# Patient Record
Sex: FEMALE | Race: WHITE
[De-identification: names, ages, dates, MRNs, and addresses within clinical notes are randomized per-mention and may not be internally consistent; named-entity substitution may affect disease eponyms.]

---

## 2018-03-25 ENCOUNTER — HOSPITAL ENCOUNTER (OUTPATIENT)
Dept: HOSPITAL 31 - C.ER | Age: 43
Setting detail: OBSERVATION
LOS: 1 days | Discharge: HOME | End: 2018-03-26
Attending: FAMILY MEDICINE | Admitting: FAMILY MEDICINE
Payer: COMMERCIAL

## 2018-03-25 DIAGNOSIS — K35.3: Primary | ICD-10-CM

## 2018-03-25 DIAGNOSIS — K21.9: ICD-10-CM

## 2018-03-25 LAB
ALBUMIN SERPL-MCNC: 4.2 G/DL (ref 3.5–5)
ALBUMIN/GLOB SERPL: 1.1 {RATIO} (ref 1–2.1)
ALT SERPL-CCNC: 41 U/L (ref 9–52)
APTT BLD: 27 SECONDS (ref 21–34)
AST SERPL-CCNC: 44 U/L (ref 14–36)
BASOPHILS # BLD AUTO: 0.1 K/UL (ref 0–0.2)
BASOPHILS NFR BLD: 0.5 % (ref 0–2)
BILIRUB UR-MCNC: NEGATIVE MG/DL
BUN SERPL-MCNC: 11 MG/DL (ref 7–17)
CALCIUM SERPL-MCNC: 8.7 MG/DL (ref 8.6–10.4)
EOSINOPHIL # BLD AUTO: 0.1 K/UL (ref 0–0.7)
EOSINOPHIL NFR BLD: 0.5 % (ref 0–4)
ERYTHROCYTE [DISTWIDTH] IN BLOOD BY AUTOMATED COUNT: 14.6 % (ref 11.5–14.5)
GFR NON-AFRICAN AMERICAN: > 60
GLUCOSE UR STRIP-MCNC: NORMAL MG/DL
HCG,QUALITATIVE URINE: NEGATIVE
HGB BLD-MCNC: 12.9 G/DL (ref 11–16)
INR PPP: 1.1
LEUKOCYTE ESTERASE UR-ACNC: (no result) LEU/UL
LIPASE: 93 U/L (ref 23–300)
LYMPHOCYTES # BLD AUTO: 1.7 K/UL (ref 1–4.3)
LYMPHOCYTES NFR BLD AUTO: 12.3 % (ref 20–40)
MCH RBC QN AUTO: 26.1 PG (ref 27–31)
MCHC RBC AUTO-ENTMCNC: 33.4 G/DL (ref 33–37)
MCV RBC AUTO: 78.4 FL (ref 81–99)
MONOCYTES # BLD: 0.7 K/UL (ref 0–0.8)
MONOCYTES NFR BLD: 5 % (ref 0–10)
NEUTROPHILS # BLD: 11.5 K/UL (ref 1.8–7)
NEUTROPHILS NFR BLD AUTO: 81.7 % (ref 50–75)
NRBC BLD AUTO-RTO: 0 % (ref 0–2)
PH UR STRIP: 7 [PH] (ref 5–8)
PLATELET # BLD: 229 K/UL (ref 130–400)
PMV BLD AUTO: 8.9 FL (ref 7.2–11.7)
PROT UR STRIP-MCNC: NEGATIVE MG/DL
PROTHROMBIN TIME: 12.4 SECONDS (ref 9.7–12.2)
RBC # BLD AUTO: 4.92 MIL/UL (ref 3.8–5.2)
RBC # UR STRIP: NEGATIVE /UL
SP GR UR STRIP: 1.02 (ref 1–1.03)
SQUAMOUS EPITHIAL: 2 /HPF (ref 0–5)
UROBILINOGEN UR-MCNC: NORMAL MG/DL (ref 0.2–1)
WBC # BLD AUTO: 14 K/UL (ref 4.8–10.8)

## 2018-03-25 PROCEDURE — 85025 COMPLETE CBC W/AUTO DIFF WBC: CPT

## 2018-03-25 PROCEDURE — 85610 PROTHROMBIN TIME: CPT

## 2018-03-25 PROCEDURE — 96376 TX/PRO/DX INJ SAME DRUG ADON: CPT

## 2018-03-25 PROCEDURE — 74177 CT ABD & PELVIS W/CONTRAST: CPT

## 2018-03-25 PROCEDURE — 88304 TISSUE EXAM BY PATHOLOGIST: CPT

## 2018-03-25 PROCEDURE — 36415 COLL VENOUS BLD VENIPUNCTURE: CPT

## 2018-03-25 PROCEDURE — 96367 TX/PROPH/DG ADDL SEQ IV INF: CPT

## 2018-03-25 PROCEDURE — 85730 THROMBOPLASTIN TIME PARTIAL: CPT

## 2018-03-25 PROCEDURE — 84703 CHORIONIC GONADOTROPIN ASSAY: CPT

## 2018-03-25 PROCEDURE — 96366 THER/PROPH/DIAG IV INF ADDON: CPT

## 2018-03-25 PROCEDURE — 96375 TX/PRO/DX INJ NEW DRUG ADDON: CPT

## 2018-03-25 PROCEDURE — 93005 ELECTROCARDIOGRAM TRACING: CPT

## 2018-03-25 PROCEDURE — 81001 URINALYSIS AUTO W/SCOPE: CPT

## 2018-03-25 PROCEDURE — 83735 ASSAY OF MAGNESIUM: CPT

## 2018-03-25 PROCEDURE — 99285 EMERGENCY DEPT VISIT HI MDM: CPT

## 2018-03-25 PROCEDURE — 71045 X-RAY EXAM CHEST 1 VIEW: CPT

## 2018-03-25 PROCEDURE — 44970 LAPAROSCOPY APPENDECTOMY: CPT

## 2018-03-25 PROCEDURE — 96361 HYDRATE IV INFUSION ADD-ON: CPT

## 2018-03-25 PROCEDURE — 80053 COMPREHEN METABOLIC PANEL: CPT

## 2018-03-25 PROCEDURE — 96365 THER/PROPH/DIAG IV INF INIT: CPT

## 2018-03-25 PROCEDURE — 83690 ASSAY OF LIPASE: CPT

## 2018-03-25 PROCEDURE — 84100 ASSAY OF PHOSPHORUS: CPT

## 2018-03-25 RX ADMIN — WATER SCH MLS/HR: 1 INJECTION INTRAMUSCULAR; INTRAVENOUS; SUBCUTANEOUS at 20:59

## 2018-03-25 RX ADMIN — SODIUM CHLORIDE SCH MLS: 9 INJECTION, SOLUTION INTRAVENOUS at 13:45

## 2018-03-25 RX ADMIN — BUPIVACAINE HYDROCHLORIDE ONE ML: 2.5 INJECTION, SOLUTION EPIDURAL; INFILTRATION; INTRACAUDAL; PERINEURAL at 13:13

## 2018-03-25 RX ADMIN — WATER SCH MLS: 1 INJECTION INTRAMUSCULAR; INTRAVENOUS; SUBCUTANEOUS at 13:30

## 2018-03-25 RX ADMIN — LIDOCAINE HYDROCHLORIDE AND EPINEPHRINE ONE ML: 10; 10 INJECTION, SOLUTION INFILTRATION; PERINEURAL at 13:13

## 2018-03-25 RX ADMIN — BUPIVACAINE HYDROCHLORIDE ONE ML: 2.5 INJECTION, SOLUTION EPIDURAL; INFILTRATION; INTRACAUDAL; PERINEURAL at 13:50

## 2018-03-25 RX ADMIN — OXYCODONE HYDROCHLORIDE AND ACETAMINOPHEN PRN TAB: 5; 325 TABLET ORAL at 20:05

## 2018-03-25 RX ADMIN — SODIUM CHLORIDE SCH MLS/HR: 9 INJECTION, SOLUTION INTRAVENOUS at 20:59

## 2018-03-25 RX ADMIN — LIDOCAINE HYDROCHLORIDE AND EPINEPHRINE ONE ML: 10; 10 INJECTION, SOLUTION INFILTRATION; PERINEURAL at 13:50

## 2018-03-25 NOTE — C.PDOC
History Of Present Illness





<Sapna Morales - Last Filed: 03/25/18 06:30>





<Mitch Barlow N - Last Filed: 03/25/18 07:29>





44 y/o female with c/o of epigastric pain, vomiting x 2 and loose stools after 

returning from work this morning. Pt reported eating spaghetti from a can which 

could have caused these symptoms. Pt denies fever, bloody stools, recent travel 

or sick contacts (Sapna Morales)


History Per: Patient


History/Exam Limitations: no limitations


Onset/Duration Of Symptoms: Sudden Onset (PTA)


Severity: Moderate


Associated Symptoms: Nausea, Vomiting, Diarrhea (loose stools).  denies: Fever, 

Back Pain, Chest Pain





<Sapna Morales - Last Filed: 03/25/18 06:30>





<Mitch Barlow N - Last Filed: 03/25/18 07:29>


Time Seen by Provider: 03/25/18 03:35


Chief Complaint (Nursing): Abdominal Pain





Past Medical History





- Medical History


PMH: GERD


   Denies: Chronic Kidney Disease


Surgical History: No Surg Hx


Family History: States: Unknown Family Hx





- Social History


Hx Alcohol Use: No


Hx Substance Use: No





<Sapna Morales - Last Filed: 03/25/18 06:30>


Vital Signs: 





 Last Vital Signs











Temp  98 F   03/25/18 06:47


 


Pulse  64   03/25/18 06:47


 


Resp  18   03/25/18 06:47


 


BP  112/68   03/25/18 06:47


 


Pulse Ox  99   03/25/18 06:47














Review Of Systems


Constitutional: Negative for: Fever


Cardiovascular: Negative for: Chest Pain, Light Headedness


Respiratory: Negative for: Cough, Shortness of Breath


Gastrointestinal: Positive for: Nausea, Vomiting, Abdominal Pain (epigastric).  

Negative for: Constipation


Genitourinary: Negative for: Dysuria, Frequency, Hematuria


Neurological: Negative for: Weakness, Numbness





<Sapna Morales - Last Filed: 03/25/18 06:30>


Constitutional: Negative for: Weakness


Eyes: Negative for: Pain, Vision Change


ENT: Negative for: Ear Pain, Ear Discharge


Cardiovascular: Negative for: Palpitations


Gastrointestinal: Negative for: Melena


Musculoskeletal: Negative for: Neck Pain, Shoulder Pain


Skin: Negative for: Rash


Neurological: Negative for: Confusion


Psych: Negative for: Anxiety, Psychosis





<Mitch Barlow N - Last Filed: 03/25/18 07:29>





Physical Exam





- Physical Exam


Appears: Well, No Acute Distress


Eye(s): bilateral: Normal Inspection, PERRL


Oral Mucosa: Moist


Neck: Normal


Respiratory: Normal Breath Sounds


Gastrointestinal/Abdominal: Bowel Sounds (normal), Soft, Tenderness (

epigasrtric and LUQ, no RUQ or RLQ tenderness), No Mass, No Distention, No 

Guarding, No Rebound


Back: Normal Inspection, No CVA Tenderness


Neurological/Psych: Oriented x3, Normal Cognition


Gait: Steady





<Sapna Morales - Last Filed: 03/25/18 06:30>





ED Course And Treatment





- Laboratory Results


Result Diagrams: 


 03/25/18 04:10





 03/25/18 04:10


O2 Sat by Pulse Oximetry: 98


Pulse Ox Interpretation: Normal


Progress Note: Labs incl UA, hcg, IVF hydration, pepcid and reglan IV and 

donnatal PO.  Pt continued with pain, morphine IV ordered.  45 mns later pt 

still with epigastric pain - abd and pelvic CT with contrst ordered





<Sapna Morales - Last Filed: 03/25/18 06:30>





- Laboratory Results


Result Diagrams: 


 03/25/18 04:10





 03/25/18 04:10





<Mitch Barlow N - Last Filed: 03/25/18 07:29>





Medical Decision Making





<Sapna Morales - Last Filed: 03/25/18 06:30>





<Mitch Barlow N - Last Filed: 03/25/18 07:29>


Medical Decision Making: 





pt with abdominal pian. persistent. diffuse.labs reviewed. 


ct a/p ordered. (Mitch Barlow)





Disposition


Counseled Patient/Family Regarding: Diagnosis, Need For Followup





- Disposition


Disposition Time: 06:33





<Sapna Morales - Last Filed: 03/25/18 06:30>





<Mitch Barlow N - Last Filed: 03/25/18 07:29>





- Disposition


Condition: STABLE


Forms:  [facility] (English)





- Clinical Impression


Clinical Impression: 


 Vomiting, Abdominal pain








Physician Patient Turnover


Patient Signed Over To: Seda Zapata


Handoff Comments: Pending CT and disposition





<Sapna Morales - Last Filed: 03/25/18 06:30>

## 2018-03-25 NOTE — CT
PROCEDURE:  CT Abdomen and Pelvis with contrast



HISTORY:

Abdominal pain and vomiting 



COMPARISON:

None.



TECHNIQUE:

CT scan of the abdomen and pelvis was performed after administration 

of intravenous contrast. Oral contrast was not administered.  Coronal 

and sagittal reformatted images were obtained. 



Contrast dose: 100 mL Omnipaque 300



Radiation dose:



Total exam DLP = 1082.36 mGy-cm.



This CT exam was performed using one or more of the following dose 

reduction techniques: Automated exposure control, adjustment of the 

mA and/or kV according to patient size, and/or use of iterative 

reconstruction technique.



FINDINGS:



LOWER THORAX:

There is linear atelectasis/ scarring in the lung bases. 



LIVER:

Normal in size with homogeneous enhancement. No gross lesion or 

ductal dilatation. 



GALLBLADDER AND BILE DUCTS:

No calcified gallstones. 



PANCREAS:

Normal in size with homogeneous enhancement. No gross lesion or 

ductal dilatation.



SPLEEN:

Normal in size and appearance. 



ADRENALS:

No discrete nodule. 



KIDNEYS AND URETERS:

Both kidneys are normal in size and there is homogeneous enhancement 

without hydronephrosis or solid mass. 



VASCULATURE:

No aortic aneurysm. 



BOWEL:

The small bowel loops are normal in caliber. The colon is 

decompressed. There is scattered colonic diverticulosis without CT 

evidence for acute diverticulitis. 



APPENDIX:

The appendix is fluid-filled, distended and measures 12 mm with mild 

wall enhancement and inflammatory changes in the surrounding 

mesenteric fat.  No evidence of perforation or abscess. 



PERITONEUM:

No free fluid. No free air. 



LYMPH NODES:

No enlarged lymph nodes. 



BLADDER:

Unremarkable. 



REPRODUCTIVE:

The uterus is normal in size. 



BONES:

No acute fracture. Within normal limits for the patient's age. 



OTHER FINDINGS:

None.



IMPRESSION:

Findings are most compatible with acute appendicitis. No evidence for 

perforation or abscess.



Important findings were discussed with ERAN Shelton in the ER on 

03/25/2018 at 9:30 a.m.

## 2018-03-25 NOTE — OP
PROCEDURE DATE:  03/25/2018



PREOPERATIVE DIAGNOSES:  Acute appendicitis and leukocytosis.



POSTOPERATIVE DIAGNOSES:

1.  Acute suppurative appendicitis.

2.  Pelvic and periappendicular small abscess.



PROCEDURE DONE:

1.  Laparoscopic appendectomy.

2.  Laparoscopic drainage of small pelvic and periappendicular abscess.



SURGEON:  The procedure was done by Dr. Milla benites.



ASSISTANT:  Toni Hernandez, PGY-2 resident.



TYPE OF ANESTHESIA:  General endotracheal tube anesthesia.



ESTIMATED BLOOD LOSS:  Around 10 mL.



DRAIN:  None.



PATHOLOGY:  Appendix was sent for the pathology.



COMPLICATIONS:  None.



INTRAOPERATIVE FINDINGS:  The patient had acute suppurative appendicitis

with purulent small pelvic collection and periappendicular collection.



DESCRIPTION OF PROCEDURE:  On intraoperative steps, this 43-year-old female

who was diagnosed with acute appendicitis with leukocytosis and the patient

was consented for laparoscopic appendectomy, possible open, brought to the

OR, placed supine on the operating table.  After induction of the

anesthesia, abdomen was prepped and draped in the usual sterile fashion. 

Supraumbilical transverse incision was made after incising skin,

subcutaneous tissue and fascia.  The Alfred port was placed, pneumo was

created.  The 5-mm port was placed in the suprapubic region.  A 12-mm port

was placed in a left lower quadrant.  The grasper and dissector was

introduced and periappendicular collection was drained and the patient also

had a pelvic collection that was also drained; and now the mesoappendix was

resected with the harmonic scalpel.  The base of the appendix was resected

with the YUNI and appendix was taken in an EndoCatch bag, taken out through

the umbilical port site, and sent off the table for pathology.  The suction

irrigation of the periappendicular area and the pelvic area was done and

all the collection of fluid was suctioned out.  Hemostasis was achieved. 

All the port was taken out under vision.  Pneumo was deflated.  The

umbilical port site was closed in 2 layers, fascia with 0-Vicryl

interrupted sutures, skin with the 4-0 Monocryl and dry sterile dressing

was applied.  The patient tolerated the procedure well.  Count of the

instrument and gauze was correct.  There was no apparent complication.





__________________________________________

Sami Loyola MD





DD:  03/25/2018 15:41:35

DT:  03/25/2018 22:49:45

Job # [id number]

## 2018-03-25 NOTE — RAD
HISTORY:

pre op  



COMPARISON:

12/11/2015. 



FINDINGS:



LUNGS:

The lungs are clear.



PLEURA:

No significant pleural effusion identified, no pneumothorax apparent.



CARDIOVASCULAR:

Normal.



OSSEOUS STRUCTURES:

No significant abnormalities.



VISUALIZED UPPER ABDOMEN:

Normal.



OTHER FINDINGS:

None.



IMPRESSION:

No active pulmonary disease.

## 2018-03-25 NOTE — PCM.SURG1
Surgeon's Initial Post Op Note





- Surgeon's Notes


Surgeon: Dr. Loyola


Assistant: Mary PGY1


Type of Anesthesia: General Endo


Anesthesia Administered By: Dr. Coffey


Pre-Operative Diagnosis: Acute Appendicitis


Operative Findings: Supparative Appendicitis. See operative report


Post-Operative Diagnosis: same


Operation Performed: Laparascopic Appendectomy


Specimen/Specimens Removed: Appendix


Estimated Blood Loss: EBL {In ML}: 10


Blood Products Given: N/A


Drains Used: No Drains


Post-Op Condition: Good


Date of Surgery/Procedure: 03/25/18


Time of Surgery/Procedure: 14:49

## 2018-03-25 NOTE — CP.PCM.CON
<Toni Hernandez - Last Filed: 03/25/18 16:16>





History of Present Illness





- History of Present Illness


History of Present Illness: 





Surgery Consult note. Dr. Loyola





44yo F with no significant PMHx here for evaluation of Abdominal pain. Pain 

started this morning at 12:15AM, located in the RLQ, associated with nausea and 

vomiting x3 episodes (non-bilious, non-bloody), no diarrhea. No association 

with food intake. Last food intake was 5PM yesterday. Walking and movement 

exacerbates the abdominal pain. No fevers or chills. No urinary complaints. No 

sick contacts. No headaches. 





PMHx: Denies 


PSHx: Denies


Social Hx: Denies Tobacco, denies drugs, occasional ETOH use. Lives at home 

with family


Family Hx: non-contributory


NKDA





Review of Systems





- Review of Systems


All systems: reviewed and no additional remarkable complaints except





- Constitutional


Constitutional: absent: Chills, Fever





- Cardiovascular


Cardiovascular: absent: Chest Pain, Dyspnea





- Respiratory


Respiratory: absent: Cough, Dyspnea





- Gastrointestinal


Gastrointestinal: Abdominal Pain, Nausea, Vomiting.  absent: Diarrhea





- Genitourinary


Genitourinary: absent: Difficulty Urinating, Dysuria





Past Patient History





- Past Medical History & Family History


Past Medical History?: Yes





- Past Social History


Smoking Status: Never Smoked





- CARDIAC


Hx Cardiac Disorders: No





- PULMONARY


Hx Respiratory Disorders: No





- NEUROLOGICAL


Hx Neurological Disorder: No





- HEENT


Hx HEENT Problems: No





- RENAL


Hx Chronic Kidney Disease: No





- ENDOCRINE/METABOLIC


Hx Endocrine Disorders: No





- HEMATOLOGICAL/ONCOLOGICAL


Hx Blood Disorders: No





- INTEGUMENTARY


Hx Dermatological Problems: No





- MUSCULOSKELETAL/RHEUMATOLOGICAL


Hx Musculoskeletal Disorders: No





- GASTROINTESTINAL


Hx Gastrointestinal Disorders: No





- GENITOURINARY/GYNECOLOGICAL


Hx Genitourinary Disorders: No





- PSYCHIATRIC


Hx Substance Use: No





- SURGICAL HISTORY


Hx Surgeries: No





Meds


Allergies/Adverse Reactions: 


 Allergies











Allergy/AdvReac Type Severity Reaction Status Date / Time


 


No Known Allergies Allergy   Verified 03/25/18 03:40














- Medications


Medications: 


 Current Medications





Acetaminophen (Tylenol 325mg Tab)  650 mg PO Q6 PRN


   PRN Reason: Fever >100.4 F


Sodium Chloride (Sodium Chloride 0.9%)  1,000 mls @ 125 mls/hr IV .Q8H ISATU


Piperacillin Sod/Tazobactam (Sod 3.375 gm/ Sodium Chloride)  100 mls @ 200 mls/

hr IVPB Q6H ISATU


   PRN Reason: Protocol


Metronidazole (Flagyl)  500 mg in 100 mls @ 100 mls/hr IVPB Q8 ISATU


   PRN Reason: Protocol


Ondansetron HCl (Zofran Inj)  4 mg IVP Q6H PRN


   PRN Reason: Nausea/Vomiting











Physical Exam





- Constitutional


Appears: Well, Non-toxic, No Acute Distress





- Head Exam


Head Exam: ATRAUMATIC, NORMAL INSPECTION, NORMOCEPHALIC





- Eye Exam


Eye Exam: EOMI, Normal appearance





- ENT Exam


ENT Exam: Mucous Membranes Moist





- Respiratory Exam


Respiratory Exam: Clear to Auscultation Bilateral, NORMAL BREATHING PATTERN.  

absent: Accessory Muscle Use, Respiratory Distress





- Cardiovascular Exam


Cardiovascular Exam: RRR.  absent: JVD





- GI/Abdominal Exam


GI & Abdominal Exam: Rebound, Soft, Tenderness (Right lower quadrant tenderness)

.  absent: Distended, Firm, Guarding, Rigid





- Extremities Exam


Extremities exam: Positive for: normal inspection.  Negative for: calf 

tenderness





- Back Exam


Back exam: NORMAL INSPECTION





- Neurological Exam


Neurological exam: Alert, Oriented x3





- Psychiatric Exam


Psychiatric exam: Normal Affect, Normal Mood





- Skin


Skin Exam: Dry, Intact, Normal Color, Warm





Results





- Vital Signs


Recent Vital Signs: 


 Last Vital Signs











Temp  97.9 F   03/25/18 07:36


 


Pulse  88   03/25/18 10:04


 


Resp  17   03/25/18 10:04


 


BP  122/70   03/25/18 10:04


 


Pulse Ox  99   03/25/18 10:04














- Labs


Result Diagrams: 


 03/25/18 04:10





 03/25/18 04:10


Labs: 


 Laboratory Results - last 24 hr











  03/25/18 03/25/18 03/25/18





  04:10 04:10 05:43


 


WBC  14.0 H D  


 


RBC  4.92  


 


Hgb  12.9  


 


Hct  38.6  


 


MCV  78.4 L  


 


MCH  26.1 L  


 


MCHC  33.4  


 


RDW  14.6 H  


 


Plt Count  229  


 


MPV  8.9  


 


Neut % (Auto)  81.7 H  


 


Lymph % (Auto)  12.3 L  


 


Mono % (Auto)  5.0  


 


Eos % (Auto)  0.5  


 


Baso % (Auto)  0.5  


 


Neut # (Auto)  11.5 H  


 


Lymph # (Auto)  1.7  


 


Mono # (Auto)  0.7  


 


Eos # (Auto)  0.1  


 


Baso # (Auto)  0.1  


 


Sodium   140 


 


Potassium   4.1 


 


Chloride   103 


 


Carbon Dioxide   24 


 


Anion Gap   16 


 


BUN   11 


 


Creatinine   0.6 L 


 


Est GFR ( Amer)   > 60 


 


Est GFR (Non-Af Amer)   > 60 


 


Random Glucose   122 H 


 


Calcium   8.7 


 


Total Bilirubin   0.7 


 


AST   44 H 


 


ALT   41 


 


Alkaline Phosphatase   62 


 


Total Protein   8.0 


 


Albumin   4.2 


 


Globulin   3.8 


 


Albumin/Globulin Ratio   1.1 


 


Lipase   93 


 


Urine Color    Yellow


 


Urine Clarity    Clear


 


Urine pH    7.0


 


Ur Specific Gravity    1.016


 


Urine Protein    Negative


 


Urine Glucose (UA)    Normal


 


Urine Ketones    Trace


 


Urine Blood    Negative


 


Urine Nitrate    Negative


 


Urine Bilirubin    Negative


 


Urine Urobilinogen    Normal


 


Ur Leukocyte Esterase    Neg


 


Urine WBC (Auto)    < 1


 


Urine RBC (Auto)    1


 


Ur Squamous Epith Cells    2


 


Urine HCG, Qual    Negative














Assessment & Plan





- Assessment and Plan (Free Text)


Assessment: 





44yo F with acute appendicitis


Plan: 





- To OR today


- NPO


- IVF


- IV abx


- Pain management





Further recs as per Dr. Milla Hernandez PGY1


surgery pager: [phone]





<Sami Loyola - Last Filed: 03/26/18 21:21>





Results





- Vital Signs


Recent Vital Signs: 


 Last Vital Signs











Temp  98.8 F   03/26/18 08:20


 


Pulse  76   03/26/18 08:20


 


Resp  18   03/26/18 08:20


 


BP  117/77   03/26/18 08:20


 


Pulse Ox  99   03/26/18 08:20














- Labs


Result Diagrams: 


 03/26/18 06:16





 03/26/18 06:16


Labs: 


 Laboratory Results - last 24 hr











  03/26/18 03/26/18





  06:16 06:16


 


WBC  6.7  D 


 


RBC  4.40 


 


Hgb  11.6 


 


Hct  34.3 


 


MCV  77.9 L 


 


MCH  26.2 L 


 


MCHC  33.7 


 


RDW  14.9 H 


 


Plt Count  178 


 


MPV  9.2 


 


Neut % (Auto)  70.3 


 


Lymph % (Auto)  21.1 


 


Mono % (Auto)  8.0 


 


Eos % (Auto)  0.2 


 


Baso % (Auto)  0.4 


 


Neut # (Auto)  4.7 


 


Lymph # (Auto)  1.4 


 


Mono # (Auto)  0.5 


 


Eos # (Auto)  0.0 


 


Baso # (Auto)  0.0 


 


Sodium   139


 


Potassium   3.0 L


 


Chloride   104


 


Carbon Dioxide   24


 


Anion Gap   14


 


BUN   6 L


 


Creatinine   0.7


 


Est GFR ( Amer)   > 60


 


Est GFR (Non-Af Amer)   > 60


 


Random Glucose   110 H


 


Calcium   8.0 L


 


Phosphorus   2.8


 


Magnesium   1.9


 


Total Bilirubin   0.5


 


AST   21


 


ALT   32


 


Alkaline Phosphatase   50


 


Total Protein   6.0 L


 


Albumin   3.0 L D


 


Globulin   3.0


 


Albumin/Globulin Ratio   1.0














Attending/Attestation





- Attestation


I have personally seen and examined this patient.: Yes


I have fully participated in the care of the patient.: Yes


I have reviewed all pertinent clinical information: Yes


Notes (Text): 





Pt was seen and examine at bedside


Agree with above note and assessment


Pt with RLQ pain and tenderness


Labs and radiology reviewed


Ass: Acute Appendicitis


Plan: Lap Appendectomy possible Open


Consent


NPO, IVF


IV antibiotics


Plan d.w pt in detail


Risk and benefit explained in detail.

## 2018-03-25 NOTE — CP.PCM.HP
<Deric Nj - Last Filed: 03/25/18 12:03>





History of Present Illness





- History of Present Illness


History of Present Illness: 





PGY-1 H&P for Dr. Pandey


CC: abdominal pain





This is a 43 year old female with no significant PMHx who presents complaining 

of severe abdominal pain. Patient states that this started yesterday when she 

was coming home from work. Patient states that it is diffuse in location and 

described as a cramping and twisting sensation 10/10 without radiation to the 

back. Movement exacerbates it, and the IV morphine received in the ED has 

alleviated her pain greatly. Patient admits associated nausea/vomiting clear in 

color. There were 3 bouts of vomiting at home yesterday and 5 bouts today. 

Patient denies any other complaints at this time.





PMHx: denies


PSHx: denies


Allergies: NKDA


Social: Denies tobacco, drugs. Occasional alcohol use. Works as a  

and lives with her .


Family Hx: Mother with stroke and pancreatic CA. Father with liver disease and 

bone CA.





PMD: Does not have a PMD at the moment since she recently acquired insurance


Home meds: Ranitidine 150 mg PO daily





Present on Admission





- Present on Admission


Any Indicators Present on Admission: No





Review of Systems





- Constitutional


Constitutional: absent: Chills, Fever





- EENT


Eyes: absent: Change in Vision


Ears: absent: Decreased Hearing


Nose/Mouth/Throat: absent: Nasal Congestion





- Cardiovascular


Cardiovascular: absent: Chest Pain





- Respiratory


Respiratory: absent: Dyspnea





- Gastrointestinal


Gastrointestinal: Abdominal Pain (diffuse), Nausea, Vomiting.  absent: 

Constipation, Diarrhea





- Genitourinary


Genitourinary: absent: Dysuria





- Musculoskeletal


Musculoskeletal: absent: Back Pain





- Integumentary


Integumentary: absent: Rash





- Neurological


Neurological: Dizziness (occasional when vomiting).  absent: Weakness





- Psychiatric


Psychiatric: absent: Anxiety





- Endocrine


Endocrine: absent: Palpitations





Past Patient History





- Past Medical History & Family History


Past Medical History?: Yes





- Past Social History


Smoking Status: Never Smoked





- CARDIAC


Hx Cardiac Disorders: No





- PULMONARY


Hx Respiratory Disorders: No





- NEUROLOGICAL


Hx Neurological Disorder: No





- HEENT


Hx HEENT Problems: No





- RENAL


Hx Chronic Kidney Disease: No





- ENDOCRINE/METABOLIC


Hx Endocrine Disorders: No





- HEMATOLOGICAL/ONCOLOGICAL


Hx Blood Disorders: No





- INTEGUMENTARY


Hx Dermatological Problems: No





- MUSCULOSKELETAL/RHEUMATOLOGICAL


Hx Musculoskeletal Disorders: No





- GASTROINTESTINAL


Hx Gastrointestinal Disorders: No





- GENITOURINARY/GYNECOLOGICAL


Hx Genitourinary Disorders: No





- PSYCHIATRIC


Hx Substance Use: No





- SURGICAL HISTORY


Hx Surgeries: No





Meds


Allergies/Adverse Reactions: 


 Allergies











Allergy/AdvReac Type Severity Reaction Status Date / Time


 


No Known Allergies Allergy   Verified 03/25/18 03:40














Physical Exam





- Constitutional


Appears: No Acute Distress





- Head Exam


Head Exam: ATRAUMATIC, NORMOCEPHALIC





- Eye Exam


Eye Exam: EOMI, PERRL





- ENT Exam


ENT Exam: Mucous Membranes Moist





- Respiratory Exam


Respiratory Exam: Clear to Auscultation Bilateral, NORMAL BREATHING PATTERN.  

absent: Rales, Rhonchi, Wheezes





- Cardiovascular Exam


Cardiovascular Exam: REGULAR RHYTHM, +S1, +S2





- GI/Abdominal Exam


GI & Abdominal Exam: Normal Bowel Sounds, Soft, Tenderness (mild diffuse 

tenderness markedly reduced as she was seen after morphine administration. 

positive Rovsing's sign).  absent: Distended





- Extremities Exam


Extremities exam: Positive for: normal capillary refill, pedal pulses present.  

Negative for: pedal edema, tenderness





- Neurological Exam


Neurological exam: Alert, CN II-XII Intact, Oriented x3





- Psychiatric Exam


Psychiatric exam: Normal Affect, Normal Mood





- Skin


Skin Exam: Dry, Warm





Results





- Vital Signs


Recent Vital Signs: 





 Last Vital Signs











Temp  98.2 F   03/25/18 11:55


 


Pulse  78   03/25/18 11:55


 


Resp  18   03/25/18 11:55


 


BP  121/75   03/25/18 11:55


 


Pulse Ox  96   03/25/18 11:55














- Labs


Result Diagrams: 


 03/25/18 04:10





 03/25/18 04:10


Labs: 





 Laboratory Results - last 24 hr











  03/25/18 03/25/18 03/25/18





  04:10 04:10 05:43


 


WBC  14.0 H D  


 


RBC  4.92  


 


Hgb  12.9  


 


Hct  38.6  


 


MCV  78.4 L  


 


MCH  26.1 L  


 


MCHC  33.4  


 


RDW  14.6 H  


 


Plt Count  229  


 


MPV  8.9  


 


Neut % (Auto)  81.7 H  


 


Lymph % (Auto)  12.3 L  


 


Mono % (Auto)  5.0  


 


Eos % (Auto)  0.5  


 


Baso % (Auto)  0.5  


 


Neut # (Auto)  11.5 H  


 


Lymph # (Auto)  1.7  


 


Mono # (Auto)  0.7  


 


Eos # (Auto)  0.1  


 


Baso # (Auto)  0.1  


 


PT   


 


INR   


 


APTT   


 


Sodium   140 


 


Potassium   4.1 


 


Chloride   103 


 


Carbon Dioxide   24 


 


Anion Gap   16 


 


BUN   11 


 


Creatinine   0.6 L 


 


Est GFR ( Amer)   > 60 


 


Est GFR (Non-Af Amer)   > 60 


 


Random Glucose   122 H 


 


Calcium   8.7 


 


Total Bilirubin   0.7 


 


AST   44 H 


 


ALT   41 


 


Alkaline Phosphatase   62 


 


Total Protein   8.0 


 


Albumin   4.2 


 


Globulin   3.8 


 


Albumin/Globulin Ratio   1.1 


 


Lipase   93 


 


Urine Color    Yellow


 


Urine Clarity    Clear


 


Urine pH    7.0


 


Ur Specific Gravity    1.016


 


Urine Protein    Negative


 


Urine Glucose (UA)    Normal


 


Urine Ketones    Trace


 


Urine Blood    Negative


 


Urine Nitrate    Negative


 


Urine Bilirubin    Negative


 


Urine Urobilinogen    Normal


 


Ur Leukocyte Esterase    Neg


 


Urine WBC (Auto)    < 1


 


Urine RBC (Auto)    1


 


Ur Squamous Epith Cells    2


 


Urine HCG, Qual    Negative














  03/25/18





  11:20


 


WBC 


 


RBC 


 


Hgb 


 


Hct 


 


MCV 


 


MCH 


 


MCHC 


 


RDW 


 


Plt Count 


 


MPV 


 


Neut % (Auto) 


 


Lymph % (Auto) 


 


Mono % (Auto) 


 


Eos % (Auto) 


 


Baso % (Auto) 


 


Neut # (Auto) 


 


Lymph # (Auto) 


 


Mono # (Auto) 


 


Eos # (Auto) 


 


Baso # (Auto) 


 


PT  12.4 H


 


INR  1.1


 


APTT  27


 


Sodium 


 


Potassium 


 


Chloride 


 


Carbon Dioxide 


 


Anion Gap 


 


BUN 


 


Creatinine 


 


Est GFR ( Amer) 


 


Est GFR (Non-Af Amer) 


 


Random Glucose 


 


Calcium 


 


Total Bilirubin 


 


AST 


 


ALT 


 


Alkaline Phosphatase 


 


Total Protein 


 


Albumin 


 


Globulin 


 


Albumin/Globulin Ratio 


 


Lipase 


 


Urine Color 


 


Urine Clarity 


 


Urine pH 


 


Ur Specific Gravity 


 


Urine Protein 


 


Urine Glucose (UA) 


 


Urine Ketones 


 


Urine Blood 


 


Urine Nitrate 


 


Urine Bilirubin 


 


Urine Urobilinogen 


 


Ur Leukocyte Esterase 


 


Urine WBC (Auto) 


 


Urine RBC (Auto) 


 


Ur Squamous Epith Cells 


 


Urine HCG, Qual 














Assessment & Plan





- Assessment and Plan (Free Text)


Plan: 





Acute Appendicitis


As evidenced by CT imaging


Zosyn 3.375 gm Q6


Flagyl 500 mg Q8


Surgery consult, Dr. Loyola, help appreciated


Surgery planned for 13:00 today


Morphine 2 mg Q3 IV prn


NS 125cc/hr


follow up surgery recommendations





Pre-op CXR no active disease


Pre-op EKG NSR with no ST segment changes





Prophylaxis


Will follow up surgery recommendations for when to start post-op


Pepcid 20 mg IV daily





Disposition: Patient is medically optimized for surgery today.





Discussed with Dr. Katherin Nj PGY-1





<Juan Carlos Pandey H - Last Filed: 03/25/18 12:24>





Results





- Vital Signs


Recent Vital Signs: 





 Last Vital Signs











Temp  98.2 F   03/25/18 11:55


 


Pulse  78   03/25/18 11:55


 


Resp  18   03/25/18 11:55


 


BP  121/75   03/25/18 11:55


 


Pulse Ox  96   03/25/18 11:55














- Labs


Result Diagrams: 


 03/25/18 04:10





 03/25/18 04:10


Labs: 





 Laboratory Results - last 24 hr











  03/25/18 03/25/18 03/25/18





  04:10 04:10 05:43


 


WBC  14.0 H D  


 


RBC  4.92  


 


Hgb  12.9  


 


Hct  38.6  


 


MCV  78.4 L  


 


MCH  26.1 L  


 


MCHC  33.4  


 


RDW  14.6 H  


 


Plt Count  229  


 


MPV  8.9  


 


Neut % (Auto)  81.7 H  


 


Lymph % (Auto)  12.3 L  


 


Mono % (Auto)  5.0  


 


Eos % (Auto)  0.5  


 


Baso % (Auto)  0.5  


 


Neut # (Auto)  11.5 H  


 


Lymph # (Auto)  1.7  


 


Mono # (Auto)  0.7  


 


Eos # (Auto)  0.1  


 


Baso # (Auto)  0.1  


 


PT   


 


INR   


 


APTT   


 


Sodium   140 


 


Potassium   4.1 


 


Chloride   103 


 


Carbon Dioxide   24 


 


Anion Gap   16 


 


BUN   11 


 


Creatinine   0.6 L 


 


Est GFR ( Amer)   > 60 


 


Est GFR (Non-Af Amer)   > 60 


 


Random Glucose   122 H 


 


Calcium   8.7 


 


Total Bilirubin   0.7 


 


AST   44 H 


 


ALT   41 


 


Alkaline Phosphatase   62 


 


Total Protein   8.0 


 


Albumin   4.2 


 


Globulin   3.8 


 


Albumin/Globulin Ratio   1.1 


 


Lipase   93 


 


Urine Color    Yellow


 


Urine Clarity    Clear


 


Urine pH    7.0


 


Ur Specific Gravity    1.016


 


Urine Protein    Negative


 


Urine Glucose (UA)    Normal


 


Urine Ketones    Trace


 


Urine Blood    Negative


 


Urine Nitrate    Negative


 


Urine Bilirubin    Negative


 


Urine Urobilinogen    Normal


 


Ur Leukocyte Esterase    Neg


 


Urine WBC (Auto)    < 1


 


Urine RBC (Auto)    1


 


Ur Squamous Epith Cells    2


 


Urine HCG, Qual    Negative














  03/25/18





  11:20


 


WBC 


 


RBC 


 


Hgb 


 


Hct 


 


MCV 


 


MCH 


 


MCHC 


 


RDW 


 


Plt Count 


 


MPV 


 


Neut % (Auto) 


 


Lymph % (Auto) 


 


Mono % (Auto) 


 


Eos % (Auto) 


 


Baso % (Auto) 


 


Neut # (Auto) 


 


Lymph # (Auto) 


 


Mono # (Auto) 


 


Eos # (Auto) 


 


Baso # (Auto) 


 


PT  12.4 H


 


INR  1.1


 


APTT  27


 


Sodium 


 


Potassium 


 


Chloride 


 


Carbon Dioxide 


 


Anion Gap 


 


BUN 


 


Creatinine 


 


Est GFR ( Amer) 


 


Est GFR (Non-Af Amer) 


 


Random Glucose 


 


Calcium 


 


Total Bilirubin 


 


AST 


 


ALT 


 


Alkaline Phosphatase 


 


Total Protein 


 


Albumin 


 


Globulin 


 


Albumin/Globulin Ratio 


 


Lipase 


 


Urine Color 


 


Urine Clarity 


 


Urine pH 


 


Ur Specific Gravity 


 


Urine Protein 


 


Urine Glucose (UA) 


 


Urine Ketones 


 


Urine Blood 


 


Urine Nitrate 


 


Urine Bilirubin 


 


Urine Urobilinogen 


 


Ur Leukocyte Esterase 


 


Urine WBC (Auto) 


 


Urine RBC (Auto) 


 


Ur Squamous Epith Cells 


 


Urine HCG, Qual 














Attending/Attestation





- Attestation


I have personally seen and examined this patient.: Yes


I have fully participated in the care of the patient.: Yes


I have reviewed all pertinent clinical information: Yes


Notes (Text): 





03/25/18 12:21


Medical attending: Patient was seen and examined by me with the medical 

resident in ER bed 13. Family member present as well in the room, patient was 

ok with this. Agree with the above note by the resident





By the time I saw her she had recived morphine IV and said this had helped her 

with the abdominal pain signifigantly. Reviewed the CT, concern for 

appendicitis. She has a WBC of 14.5. 





No other signifigant medical history noted. She is already on IVF, IV Zosyn IV 

Flagyl, and NPO. She had a CXRAY and EKG, both of these were stable. 





I have been informed that they are planning for OR sometime today.





thank you


Juan Carlos Pandey

## 2018-03-26 VITALS — OXYGEN SATURATION: 99 % | RESPIRATION RATE: 18 BRPM | TEMPERATURE: 98.8 F | HEART RATE: 76 BPM

## 2018-03-26 VITALS — DIASTOLIC BLOOD PRESSURE: 77 MMHG | SYSTOLIC BLOOD PRESSURE: 117 MMHG

## 2018-03-26 LAB
ALBUMIN SERPL-MCNC: 3 G/DL (ref 3.5–5)
ALBUMIN/GLOB SERPL: 1 {RATIO} (ref 1–2.1)
ALT SERPL-CCNC: 32 U/L (ref 9–52)
AST SERPL-CCNC: 21 U/L (ref 14–36)
BASOPHILS # BLD AUTO: 0 K/UL (ref 0–0.2)
BASOPHILS NFR BLD: 0.4 % (ref 0–2)
BUN SERPL-MCNC: 6 MG/DL (ref 7–17)
CALCIUM SERPL-MCNC: 8 MG/DL (ref 8.6–10.4)
EOSINOPHIL # BLD AUTO: 0 K/UL (ref 0–0.7)
EOSINOPHIL NFR BLD: 0.2 % (ref 0–4)
ERYTHROCYTE [DISTWIDTH] IN BLOOD BY AUTOMATED COUNT: 14.9 % (ref 11.5–14.5)
GFR NON-AFRICAN AMERICAN: > 60
HGB BLD-MCNC: 11.6 G/DL (ref 11–16)
LYMPHOCYTES # BLD AUTO: 1.4 K/UL (ref 1–4.3)
LYMPHOCYTES NFR BLD AUTO: 21.1 % (ref 20–40)
MCH RBC QN AUTO: 26.2 PG (ref 27–31)
MCHC RBC AUTO-ENTMCNC: 33.7 G/DL (ref 33–37)
MCV RBC AUTO: 77.9 FL (ref 81–99)
MONOCYTES # BLD: 0.5 K/UL (ref 0–0.8)
MONOCYTES NFR BLD: 8 % (ref 0–10)
NEUTROPHILS # BLD: 4.7 K/UL (ref 1.8–7)
NEUTROPHILS NFR BLD AUTO: 70.3 % (ref 50–75)
NRBC BLD AUTO-RTO: 0 % (ref 0–2)
PLATELET # BLD: 178 K/UL (ref 130–400)
PMV BLD AUTO: 9.2 FL (ref 7.2–11.7)
RBC # BLD AUTO: 4.4 MIL/UL (ref 3.8–5.2)
WBC # BLD AUTO: 6.7 K/UL (ref 4.8–10.8)

## 2018-03-26 RX ADMIN — SODIUM CHLORIDE SCH MLS/HR: 9 INJECTION, SOLUTION INTRAVENOUS at 09:49

## 2018-03-26 RX ADMIN — OXYCODONE HYDROCHLORIDE AND ACETAMINOPHEN PRN TAB: 5; 325 TABLET ORAL at 05:48

## 2018-03-26 RX ADMIN — SODIUM CHLORIDE SCH MLS/HR: 9 INJECTION, SOLUTION INTRAVENOUS at 03:53

## 2018-03-26 RX ADMIN — WATER SCH MLS/HR: 1 INJECTION INTRAMUSCULAR; INTRAVENOUS; SUBCUTANEOUS at 05:41

## 2018-03-26 NOTE — CP.PCM.PN
<Levon Raymundo - Last Filed: 03/26/18 07:28>





Subjective





- Date & Time of Evaluation


Date of Evaluation: 03/26/18


Time of Evaluation: 07:28





- Subjective


Subjective: 





General Surgery:  Dr Loyola





PT S&E.  NAEO.  POD#1 s/p lap appy.  Pt reports she is feeling much better.  

Tolerated diet.  PAssing flatus.  Denies f/c, sob, cp, n/v.  Post-op 

instructions given at bedside.





Objective





- Vital Signs/Intake and Output


Vital Signs (last 24 hours): 


 











Temp Pulse Resp BP Pulse Ox


 


 98.6 F   81   20   108/69   96 


 


 03/26/18 04:20  03/26/18 04:20  03/26/18 04:20  03/26/18 04:20  03/26/18 04:20








Intake and Output: 


 











 03/26/18 03/26/18





 06:59 18:59


 


Intake Total 1720 


 


Balance 1720 














- Medications


Medications: 


 Current Medications





Acetaminophen (Tylenol 325mg Tab)  650 mg PO Q6 PRN


   PRN Reason: Fever >100.4 F


Famotidine (Pepcid)  20 mg IVP DAILY ISATU


Piperacillin Sod/Tazobactam (Sod 3.375 gm/ Sodium Chloride)  100 mls @ 200 mls/

hr IVPB Q6H ISATU


   PRN Reason: Protocol


   Last Admin: 03/26/18 03:53 Dose:  200 mls/hr


Metronidazole (Flagyl)  500 mg in 100 mls @ 100 mls/hr IVPB Q8 ISATU


   PRN Reason: Protocol


   Last Admin: 03/26/18 05:41 Dose:  100 mls/hr


Oxycodone/Acetaminophen (Percocet 5/325 Mg Tab)  1 tab PO Q6H PRN


   PRN Reason: Pain, moderate (4-7)


   Stop: 03/28/18 15:31


   Last Admin: 03/26/18 05:48 Dose:  1 tab


Pneumococcal Polyvalent Vaccine (Pneumovax 23 Vaccine)  0.5 ml IM .ONCE ONE


   Stop: 03/28/18 10:01











- Labs


Labs: 


 





 03/26/18 06:16 





 03/26/18 06:16 





 











PT  12.4 SECONDS (9.7-12.2)  H  03/25/18  11:20    


 


INR  1.1   03/25/18  11:20    


 


APTT  27 SECONDS (21-34)   03/25/18  11:20    














- Constitutional


Appears: Non-toxic, No Acute Distress





- Head Exam


Head Exam: NORMAL INSPECTION





- ENT Exam


ENT Exam: Mucous Membranes Moist





- Respiratory Exam


Respiratory Exam: absent: Accessory Muscle Use, Respiratory Distress





- Cardiovascular Exam


Cardiovascular Exam: REGULAR RHYTHM.  absent: Tachycardia





- GI/Abdominal Exam


GI & Abdominal Exam: Soft, Tenderness (post-op).  absent: Distended, Firm, 

Guarding, Rigid


Additional comments: 





incisions c/d/i





- Extremities Exam


Extremities Exam: absent: Pedal Edema





- Neurological Exam


Neurological Exam: Alert, Awake, Oriented x3





- Psychiatric Exam


Psychiatric exam: Normal Affect, Normal Mood





- Skin


Skin Exam: Normal Color, Warm





Assessment and Plan





- Assessment and Plan (Free Text)


Assessment: 





43F POD#1 s/p lap appy


Plan: 





clear for d/c


f/u in office in 1 week


post-op instructions given at bedside





d/w Dr Milla Raymundo, PGY3





<Sami Loyola B - Last Filed: 03/26/18 21:33>





Objective





- Vital Signs/Intake and Output


Vital Signs (last 24 hours): 


 











Temp Pulse Resp BP Pulse Ox


 


 98.8 F   76   18   117/77   99 


 


 03/26/18 08:20  03/26/18 08:20  03/26/18 08:20  03/26/18 08:20  03/26/18 08:20











- Labs


Labs: 


 





 03/26/18 06:16 





 03/26/18 06:16 





 











PT  12.4 SECONDS (9.7-12.2)  H  03/25/18  11:20    


 


INR  1.1   03/25/18  11:20    


 


APTT  27 SECONDS (21-34)   03/25/18  11:20    














Attending/Attestation





- Attestation


I have personally seen and examined this patient.: Yes


I have fully participated in the care of the patient.: Yes


I have reviewed all pertinent clinical information, including history, physical 

exam and plan: Yes


Notes (Text): 





Pt was seen and examine at bedside


Agree with above note and assessment


Pt is improving clinically


DC home 


PO antibiotics


Plan d.w pt in detail


Risk and benefit explained in detail.

## 2018-03-26 NOTE — CARD
--------------- APPROVED REPORT --------------





EKG Measurement

Heart [id number]

AL [id number]

[id number]

[id number]

[id number]



<Conclusion>

Normal sinus rhythm

Normal ECG

## 2018-03-26 NOTE — CP.PCM.DIS
<Deric Nj S - Last Filed: 03/26/18 13:27>





Provider





- Provider


Date of Admission: 


03/25/18 10:24





Attending physician: 


Veronica Nunez DO





Consults: 





General Surgery: Dr. Loyola


Time Spent in preparation of Discharge (in minutes): 35





Diagnosis





- Discharge Diagnosis


(1) Acute appendicitis


Status: Acute   Priority: High   





Hospital Course





- Lab Results


Lab Results: 


 Most Recent Lab Values











WBC  6.7 K/uL (4.8-10.8)  D 03/26/18  06:16    


 


RBC  4.40 Mil/uL (3.80-5.20)   03/26/18  06:16    


 


Hgb  11.6 g/dL (11.0-16.0)   03/26/18  06:16    


 


Hct  34.3 % (34.0-47.0)   03/26/18  06:16    


 


MCV  77.9 fL (81.0-99.0)  L  03/26/18  06:16    


 


MCH  26.2 pg (27.0-31.0)  L  03/26/18  06:16    


 


MCHC  33.7 g/dL (33.0-37.0)   03/26/18  06:16    


 


RDW  14.9 % (11.5-14.5)  H  03/26/18  06:16    


 


Plt Count  178 K/uL (130-400)   03/26/18  06:16    


 


MPV  9.2 fL (7.2-11.7)   03/26/18  06:16    


 


Neut % (Auto)  70.3 % (50.0-75.0)   03/26/18  06:16    


 


Lymph % (Auto)  21.1 % (20.0-40.0)   03/26/18  06:16    


 


Mono % (Auto)  8.0 % (0.0-10.0)   03/26/18  06:16    


 


Eos % (Auto)  0.2 % (0.0-4.0)   03/26/18  06:16    


 


Baso % (Auto)  0.4 % (0.0-2.0)   03/26/18  06:16    


 


Neut # (Auto)  4.7 K/uL (1.8-7.0)   03/26/18  06:16    


 


Lymph # (Auto)  1.4 K/uL (1.0-4.3)   03/26/18  06:16    


 


Mono # (Auto)  0.5 K/uL (0.0-0.8)   03/26/18  06:16    


 


Eos # (Auto)  0.0 K/uL (0.0-0.7)   03/26/18  06:16    


 


Baso # (Auto)  0.0 K/uL (0.0-0.2)   03/26/18  06:16    


 


PT  12.4 SECONDS (9.7-12.2)  H  03/25/18  11:20    


 


INR  1.1   03/25/18  11:20    


 


APTT  27 SECONDS (21-34)   03/25/18  11:20    


 


Sodium  139 mmol/L (132-148)   03/26/18  06:16    


 


Potassium  3.0 mmol/L (3.6-5.2)  L  03/26/18  06:16    


 


Chloride  104 mmol/L ()   03/26/18  06:16    


 


Carbon Dioxide  24 mmol/L (22-30)   03/26/18  06:16    


 


Anion Gap  14  (10-20)   03/26/18  06:16    


 


BUN  6 mg/dL (7-17)  L  03/26/18  06:16    


 


Creatinine  0.7 mg/dL (0.7-1.2)   03/26/18  06:16    


 


Est GFR ( Amer)  > 60   03/26/18  06:16    


 


Est GFR (Non-Af Amer)  > 60   03/26/18  06:16    


 


Random Glucose  110 mg/dL ()  H  03/26/18  06:16    


 


Calcium  8.0 mg/dl (8.6-10.4)  L  03/26/18  06:16    


 


Phosphorus  2.8 mg/dL (2.5-4.5)   03/26/18  06:16    


 


Magnesium  1.9 mg/dL (1.6-2.3)   03/26/18  06:16    


 


Total Bilirubin  0.5 mg/dL (0.2-1.3)   03/26/18  06:16    


 


AST  21 U/L (14-36)   03/26/18  06:16    


 


ALT  32 U/L (9-52)   03/26/18  06:16    


 


Alkaline Phosphatase  50 U/L ()   03/26/18  06:16    


 


Total Protein  6.0 g/dL (6.3-8.3)  L  03/26/18  06:16    


 


Albumin  3.0 g/dL (3.5-5.0)  L D 03/26/18  06:16    


 


Globulin  3.0 gm/dL (2.2-3.9)   03/26/18  06:16    


 


Albumin/Globulin Ratio  1.0  (1.0-2.1)   03/26/18  06:16    


 


Lipase  93 U/L ()   03/25/18  04:10    


 


Urine Color  Yellow  (YELLOW)   03/25/18  05:43    


 


Urine Clarity  Clear  (Clear)   03/25/18  05:43    


 


Urine pH  7.0  (5.0-8.0)   03/25/18  05:43    


 


Ur Specific Gravity  1.016  (1.003-1.030)   03/25/18  05:43    


 


Urine Protein  Negative mg/dL (NEGATIVE)   03/25/18  05:43    


 


Urine Glucose (UA)  Normal mg/dL (Normal)   03/25/18  05:43    


 


Urine Ketones  Trace mg/dL (NEGATIVE)   03/25/18  05:43    


 


Urine Blood  Negative  (NEGATIVE)   03/25/18  05:43    


 


Urine Nitrate  Negative  (NEGATIVE)   03/25/18  05:43    


 


Urine Bilirubin  Negative  (NEGATIVE)   03/25/18  05:43    


 


Urine Urobilinogen  Normal mg/dL (0.2-1.0)   03/25/18  05:43    


 


Ur Leukocyte Esterase  Neg Kristine/uL (Negative)   03/25/18  05:43    


 


Urine WBC (Auto)  < 1 /hpf (0-5)   03/25/18  05:43    


 


Urine RBC (Auto)  1 /hpf (0-3)   03/25/18  05:43    


 


Ur Squamous Epith Cells  2 /hpf (0-5)   03/25/18  05:43    


 


Urine HCG, Qual  Negative  (NEGATIVE)   03/25/18  05:43    














- Hospital Course


Hospital Course: 





Initial Note:


"This is a 43 year old female with no significant PMHx who presents complaining 

of severe abdominal pain. Patient states that this started yesterday when she 

was coming home from work. Patient states that it is diffuse in location and 

described as a cramping and twisting sensation 10/10 without radiation to the 

back. Movement exacerbates it, and the IV morphine received in the ED has 

alleviated her pain greatly. Patient admits associated nausea/vomiting clear in 

color. There were 3 bouts of vomiting at home yesterday and 5 bouts today. 

Patient denies any other complaints at this time."





Hospital Course:


Patient admitted for acute appendicitis due to CT evidence of inflammation and 

edema of the appendix. Patient underwent successful laparoscopic appendectomy 

on 3/25/18. Patient is stable, tolerating regular food, and passing flatus. She 

was cleared for discharge from the surgical perspective with instructions for 

office follow up in one week's time. Instructions were discussed with the 

patient by the surgical team at bedside. Patient does not have a PMD currently. 

She recently acquired insurance and is looking for one. In case she was unable 

to find one, the information for the [facility] at [place] 

was provided in her discharge paperwork.





This is a summary of the hospital course. For more information, refer to the 

medical records.





Discharge Exam





- Head Exam


Head Exam: NORMAL INSPECTION





- Additional Findings


Additional findings: 





- Constitutional


Appears: No Acute Distress





- Head Exam


Head Exam: ATRAUMATIC, NORMOCEPHALIC





- Eye Exam


Eye Exam: EOMI, PERRL





- ENT Exam


ENT Exam: Mucous Membranes Moist





- Respiratory Exam


Respiratory Exam: Clear to Auscultation Bilateral, NORMAL BREATHING PATTERN.  

absent: Rales, Rhonchi, Wheezes





- Cardiovascular Exam


Cardiovascular Exam: REGULAR RHYTHM, +S1, +S2





- GI/Abdominal Exam


GI & Abdominal Exam: Normal Bowel Sounds, Soft, Tenderness (Post-operative 

tenderness).  absent: Distended


Additional Comments: Dressing clean, dry, intact





- Extremities Exam


Extremities exam: Positive for: normal capillary refill, pedal pulses present.  

Negative for: pedal edema, tenderness





- Neurological Exam


Neurological exam: Alert, CN II-XII Intact, Oriented x3





- Psychiatric Exam


Psychiatric exam: Normal Affect, Normal Mood





- Skin


Skin Exam: Dry, Warm





Discharge Plan





- Follow Up Plan


Condition: STABLE


Disposition: HOME/ ROUTINE


Instructions:  Appendicitis, Adult (DC), Appendectomy, Laparoscopic Surgery (DC)

, Managing Pain After Surgery


Additional Instructions: 


Please continue using the incentive spirometer. Take 10 puffs every hour.


Please follow up with Dr. Loyola in the office in 1 week.


For pain, you can take over the counter Tylenol if needed. Avoid Motrin, Advil, 

Alieve unless the pain is severe. If you take one of those medications, please 

be sure to take it with food.


If you cannot find a primary care physician, we have a clinic in [place] 

called [facility].


Their info is as follows:


[phone]


[address] [place] [address]. [place] [id number]


If there are any new or worsening symptoms, please go to the nearest emergency 

room.


Referrals: 


Sami Loyola MD [Staff Provider] - 





<Veronica Nunez - Last Filed: 03/26/18 21:20>





Provider





- Provider


Date of Admission: 


03/25/18 10:24





Attending physician: 


Veronica Nunez, DO








Hospital Course





- Lab Results


Lab Results: 


 Most Recent Lab Values











WBC  6.7 K/uL (4.8-10.8)  D 03/26/18  06:16    


 


RBC  4.40 Mil/uL (3.80-5.20)   03/26/18  06:16    


 


Hgb  11.6 g/dL (11.0-16.0)   03/26/18  06:16    


 


Hct  34.3 % (34.0-47.0)   03/26/18  06:16    


 


MCV  77.9 fL (81.0-99.0)  L  03/26/18  06:16    


 


MCH  26.2 pg (27.0-31.0)  L  03/26/18  06:16    


 


MCHC  33.7 g/dL (33.0-37.0)   03/26/18  06:16    


 


RDW  14.9 % (11.5-14.5)  H  03/26/18  06:16    


 


Plt Count  178 K/uL (130-400)   03/26/18  06:16    


 


MPV  9.2 fL (7.2-11.7)   03/26/18  06:16    


 


Neut % (Auto)  70.3 % (50.0-75.0)   03/26/18  06:16    


 


Lymph % (Auto)  21.1 % (20.0-40.0)   03/26/18  06:16    


 


Mono % (Auto)  8.0 % (0.0-10.0)   03/26/18  06:16    


 


Eos % (Auto)  0.2 % (0.0-4.0)   03/26/18  06:16    


 


Baso % (Auto)  0.4 % (0.0-2.0)   03/26/18  06:16    


 


Neut # (Auto)  4.7 K/uL (1.8-7.0)   03/26/18  06:16    


 


Lymph # (Auto)  1.4 K/uL (1.0-4.3)   03/26/18  06:16    


 


Mono # (Auto)  0.5 K/uL (0.0-0.8)   03/26/18  06:16    


 


Eos # (Auto)  0.0 K/uL (0.0-0.7)   03/26/18  06:16    


 


Baso # (Auto)  0.0 K/uL (0.0-0.2)   03/26/18  06:16    


 


PT  12.4 SECONDS (9.7-12.2)  H  03/25/18  11:20    


 


INR  1.1   03/25/18  11:20    


 


APTT  27 SECONDS (21-34)   03/25/18  11:20    


 


Sodium  139 mmol/L (132-148)   03/26/18  06:16    


 


Potassium  3.0 mmol/L (3.6-5.2)  L  03/26/18  06:16    


 


Chloride  104 mmol/L ()   03/26/18  06:16    


 


Carbon Dioxide  24 mmol/L (22-30)   03/26/18  06:16    


 


Anion Gap  14  (10-20)   03/26/18  06:16    


 


BUN  6 mg/dL (7-17)  L  03/26/18  06:16    


 


Creatinine  0.7 mg/dL (0.7-1.2)   03/26/18  06:16    


 


Est GFR ( Amer)  > 60   03/26/18  06:16    


 


Est GFR (Non-Af Amer)  > 60   03/26/18  06:16    


 


Random Glucose  110 mg/dL ()  H  03/26/18  06:16    


 


Calcium  8.0 mg/dl (8.6-10.4)  L  03/26/18  06:16    


 


Phosphorus  2.8 mg/dL (2.5-4.5)   03/26/18  06:16    


 


Magnesium  1.9 mg/dL (1.6-2.3)   03/26/18  06:16    


 


Total Bilirubin  0.5 mg/dL (0.2-1.3)   03/26/18  06:16    


 


AST  21 U/L (14-36)   03/26/18  06:16    


 


ALT  32 U/L (9-52)   03/26/18  06:16    


 


Alkaline Phosphatase  50 U/L ()   03/26/18  06:16    


 


Total Protein  6.0 g/dL (6.3-8.3)  L  03/26/18  06:16    


 


Albumin  3.0 g/dL (3.5-5.0)  L D 03/26/18  06:16    


 


Globulin  3.0 gm/dL (2.2-3.9)   03/26/18  06:16    


 


Albumin/Globulin Ratio  1.0  (1.0-2.1)   03/26/18  06:16    


 


Lipase  93 U/L ()   03/25/18  04:10    


 


Urine Color  Yellow  (YELLOW)   03/25/18  05:43    


 


Urine Clarity  Clear  (Clear)   03/25/18  05:43    


 


Urine pH  7.0  (5.0-8.0)   03/25/18  05:43    


 


Ur Specific Gravity  1.016  (1.003-1.030)   03/25/18  05:43    


 


Urine Protein  Negative mg/dL (NEGATIVE)   03/25/18  05:43    


 


Urine Glucose (UA)  Normal mg/dL (Normal)   03/25/18  05:43    


 


Urine Ketones  Trace mg/dL (NEGATIVE)   03/25/18  05:43    


 


Urine Blood  Negative  (NEGATIVE)   03/25/18  05:43    


 


Urine Nitrate  Negative  (NEGATIVE)   03/25/18  05:43    


 


Urine Bilirubin  Negative  (NEGATIVE)   03/25/18  05:43    


 


Urine Urobilinogen  Normal mg/dL (0.2-1.0)   03/25/18  05:43    


 


Ur Leukocyte Esterase  Neg Kristine/uL (Negative)   03/25/18  05:43    


 


Urine WBC (Auto)  < 1 /hpf (0-5)   03/25/18  05:43    


 


Urine RBC (Auto)  1 /hpf (0-3)   03/25/18  05:43    


 


Ur Squamous Epith Cells  2 /hpf (0-5)   03/25/18  05:43    


 


Urine HCG, Qual  Negative  (NEGATIVE)   03/25/18  05:43    














Attending/Attestation





- Attestation


I have personally seen and examined this patient.: Yes


I have fully participated in the care of the patient.: Yes


I have reviewed all pertinent clinical information, including history, physical 

exam and plan: Yes


Notes (Text): 


patient seen, examined and case discussed with medical resident.


Patient seen and examined. Patient denies abdominal pain, passing flatus, and 

diet advanced to regular per surgery.


Per surgery, patient is stable for discharge. No antibiotics upon discharge. 

Recommended to follow-up post-operative care following discharge from hospital 

with surgery within one week.


Patient recommended to establish care at the [facility] in 

[place] for health care maintenance. Phone number and address provided to 

patient on discharge.


White count as normalized. Afebrile upon discharge.